# Patient Record
Sex: FEMALE | Employment: UNEMPLOYED
[De-identification: names, ages, dates, MRNs, and addresses within clinical notes are randomized per-mention and may not be internally consistent; named-entity substitution may affect disease eponyms.]

---

## 2018-07-20 ENCOUNTER — CONSULTATION (OUTPATIENT)
Age: 83
End: 2018-07-20

## 2018-07-20 VITALS — HEIGHT: 55 IN | SYSTOLIC BLOOD PRESSURE: 120 MMHG | DIASTOLIC BLOOD PRESSURE: 60 MMHG

## 2018-07-20 NOTE — PATIENT DISCUSSION
Greater than 50% of exam spent in face to face dialogue with patient and her daughter. I have deferred any treatment at this stage, due to the chronic appearing CME seen on exam and OCT. I have asked her to return on an as needed basis. She will be returning to Dr. Rangel Feng for general ophthalmic care.

## 2018-07-23 ASSESSMENT — TONOMETRY
OS_IOP_MMHG: 16
OD_IOP_MMHG: 17

## 2018-07-23 ASSESSMENT — VISUAL ACUITY: OS_CC: 20/25
